# Patient Record
Sex: FEMALE | Race: WHITE
[De-identification: names, ages, dates, MRNs, and addresses within clinical notes are randomized per-mention and may not be internally consistent; named-entity substitution may affect disease eponyms.]

---

## 2018-08-22 ENCOUNTER — HOSPITAL ENCOUNTER (OUTPATIENT)
Dept: HOSPITAL 61 - PCVCCLINIC | Age: 68
Discharge: HOME | End: 2018-08-22
Attending: INTERNAL MEDICINE
Payer: MEDICARE

## 2018-08-22 DIAGNOSIS — Z87.891: ICD-10-CM

## 2018-08-22 DIAGNOSIS — I25.10: Primary | ICD-10-CM

## 2018-08-22 DIAGNOSIS — E78.5: ICD-10-CM

## 2018-08-22 DIAGNOSIS — D83.9: ICD-10-CM

## 2018-08-22 DIAGNOSIS — Z79.82: ICD-10-CM

## 2018-08-22 DIAGNOSIS — I21.4: ICD-10-CM

## 2018-08-22 DIAGNOSIS — I10: ICD-10-CM

## 2018-08-22 PROCEDURE — 93005 ELECTROCARDIOGRAM TRACING: CPT

## 2018-08-22 PROCEDURE — G0463 HOSPITAL OUTPT CLINIC VISIT: HCPCS

## 2018-12-13 ENCOUNTER — HOSPITAL ENCOUNTER (OUTPATIENT)
Dept: HOSPITAL 61 - PCVCIMAG | Age: 68
Discharge: HOME | End: 2018-12-13
Attending: INTERNAL MEDICINE
Payer: MEDICARE

## 2018-12-13 DIAGNOSIS — I10: ICD-10-CM

## 2018-12-13 DIAGNOSIS — I25.10: ICD-10-CM

## 2018-12-13 DIAGNOSIS — E78.5: ICD-10-CM

## 2018-12-13 DIAGNOSIS — Z79.82: ICD-10-CM

## 2018-12-13 DIAGNOSIS — I25.2: ICD-10-CM

## 2018-12-13 DIAGNOSIS — D83.9: ICD-10-CM

## 2018-12-13 DIAGNOSIS — I34.0: Primary | ICD-10-CM

## 2018-12-13 DIAGNOSIS — Z87.891: ICD-10-CM

## 2018-12-13 DIAGNOSIS — I21.4: ICD-10-CM

## 2018-12-13 PROCEDURE — 93306 TTE W/DOPPLER COMPLETE: CPT

## 2018-12-13 PROCEDURE — G0463 HOSPITAL OUTPT CLINIC VISIT: HCPCS

## 2018-12-13 PROCEDURE — 80061 LIPID PANEL: CPT

## 2018-12-13 PROCEDURE — 36415 COLL VENOUS BLD VENIPUNCTURE: CPT

## 2018-12-13 PROCEDURE — 93005 ELECTROCARDIOGRAM TRACING: CPT

## 2018-12-13 NOTE — PCVCIMAG
--------------- APPROVED REPORT --------------





Study performed:  2018 12:57:34



EXAM: Comprehensive 2D, Doppler, and color-flow 

Echocardiogram

Patient Location: Echo lab

Status:  routine



BSA:         1.78

HR: 56 bpmBP:          140/85 mmHg



Other Information 

Study Quality: Good



Risk Factors: 

Cardiac Risk Factors:  FHX of CAD, Hyperlipidemia, 

HTN



Indications

CAD

Stent, Non-Q MI



2D Dimensions

IVSd:  8.24 (7-11mm)LVOT Diam:  18.33 (18-24mm) 

LVDd:  40.00 mm

PWd:  7.80 (7-11mm)Ascending Ao:  32.27 (22-36mm)

LVDs:  33.09 (25-40mm)

Left Atrium:  39.46 (27-40mm)

Aortic Root:  27.53 mm

LV Single Plane 4CH:  52.53 %

LV Single Plane 2CH:  57.17 %

Biplane EF:  54.3 %



Volumes

Left Atrial Volume (Systole)

Single Plane 4CH:  49.21 mLSingle Plane 2CH:  35.11 mL

LA ESV Index:  24.00 mL/m2



Aortic Valve

AoV Peak Abdias.:  1.62 m/s

AO Peak Gr.:  10.56 mmHgLVOT Max P.47 mmHg

LVOT Max V:  1.17 m/s

DOMINIC Vmax: 1.90 cm2



Mitral Valve

E/A Ratio:  1.0

MV Decel. Time:  169.17 ms

MV E Max Abdias.:  1.08 m/s

MV A Abdias.:  1.06 m/s



TDI

E/Lateral E':  10.80E/Medial E':  12.00

Medial E' Abdias.:  0.09 m/s

Lateral E' Abdias.:  0.10 m/s



Pulmonary Valve

PV Peak Gr.:  2.60 mmHg



Pulmonary Vein

P Vein S:    0.67 m/sP Vein A:  0.35 m/s

P Vein D:   0.40 m/sP Vein A Dur.:  62.3 msec

P Vein S/D Ratio:  1.67



Tricuspid Valve

TR Peak Abdias.:  2.29 m/s

TR Peak Gr.:  20.94 mmHg



Left Ventricle

The left ventricle is normal size. There is normal LV segmental wall 

motion. There is normal left ventricular wall thickness. Left 

ventricular systolic function is normal. The left ventricular 

ejection fraction is within the normal range. LVEF is 55%. The left 

ventricular diastolic function is normal.



Right Ventricle

The right ventricle is normal size. The right ventricular systolic 

function is normal.



Atria

The left atrium size is normal. The right atrium size is 

normal.



Aortic Valve

The aortic valve is trileaflet, mildly sclerotic. No aortic 

regurgitation is present. There is no aortic valvular 

stenosis.



Mitral Valve

The mitral valve is normal in structure. Mild mitral regurgitation. 

No evidence of mitral valve stenosis.



Tricuspid Valve

The tricuspid valve is normal in structure. Trace tricuspid 

regurgitation. Pulmonary artery pressure is 28mmHg.



Pulmonic Valve

The pulmonary valve is normal in structure. There is no pulmonic 

valvular regurgitation.



Great Vessels

The aortic root is normal in size. IVC is normal in size and 

collapses &gt;50% with inspiration.



Pericardium

There is no pericardial effusion.



&lt;Conclusion&gt;

Left ventricular systolic function is normal.

There is normal LV segmental wall motion.

LVEF is 55%. Normal diastolic function

The aortic valve is trileaflet, mildly sclerotic. No aortic 

regurgitation or stenosis

The mitral valve is normal in structure. Mild mitral 

regurgitation.

Trace tricuspid regurgitation. Pulmonary artery pressure is 

28mmHg.

There is no pericardial effusion.

## 2019-06-12 ENCOUNTER — HOSPITAL ENCOUNTER (OUTPATIENT)
Dept: HOSPITAL 61 - PCVCCLINIC | Age: 69
Discharge: HOME | End: 2019-06-12
Attending: INTERNAL MEDICINE
Payer: MEDICARE

## 2019-06-12 DIAGNOSIS — Z88.8: ICD-10-CM

## 2019-06-12 DIAGNOSIS — E78.5: ICD-10-CM

## 2019-06-12 DIAGNOSIS — D83.9: ICD-10-CM

## 2019-06-12 DIAGNOSIS — I25.10: Primary | ICD-10-CM

## 2019-06-12 DIAGNOSIS — I10: ICD-10-CM

## 2019-06-12 PROCEDURE — 80061 LIPID PANEL: CPT

## 2019-06-12 PROCEDURE — 93005 ELECTROCARDIOGRAM TRACING: CPT

## 2019-06-12 PROCEDURE — G0463 HOSPITAL OUTPT CLINIC VISIT: HCPCS

## 2019-06-12 PROCEDURE — 36415 COLL VENOUS BLD VENIPUNCTURE: CPT

## 2019-12-12 ENCOUNTER — HOSPITAL ENCOUNTER (OUTPATIENT)
Dept: HOSPITAL 61 - PCVCIMAG | Age: 69
Discharge: HOME | End: 2019-12-12
Attending: INTERNAL MEDICINE
Payer: MEDICARE

## 2019-12-12 DIAGNOSIS — I21.4: ICD-10-CM

## 2019-12-12 DIAGNOSIS — Z88.8: ICD-10-CM

## 2019-12-12 DIAGNOSIS — D83.9: ICD-10-CM

## 2019-12-12 DIAGNOSIS — I10: ICD-10-CM

## 2019-12-12 DIAGNOSIS — E78.5: ICD-10-CM

## 2019-12-12 DIAGNOSIS — Z87.891: ICD-10-CM

## 2019-12-12 DIAGNOSIS — I25.10: Primary | ICD-10-CM

## 2019-12-12 DIAGNOSIS — I34.0: ICD-10-CM

## 2019-12-12 PROCEDURE — 93351 STRESS TTE COMPLETE: CPT

## 2019-12-12 PROCEDURE — 93325 DOPPLER ECHO COLOR FLOW MAPG: CPT

## 2019-12-12 NOTE — PCVCIMAG
--------------- APPROVED REPORT --------------





Study performed:  12/12/2019 15:19:04



Exam:  Stress Echocardiogram

Indication: CAD, Stent

Patient Location: Echo lab

Stress Nurse: Shalini Mcrae RN

Status: routine



Ht: 5 ft 4 in  

HR: 77 bpm      BP: 130/80 mmHg

Rhythm: NSR



Medical History

Medical History: HTN, Hyperlipidemia



Procedure

The patient underwent an Exercise Stress Test using the Cedrick 

Protocol. Blood pressure, heart rate, and EKG were monitored.

An Echocardiogram was performed by technician in four stages in quad 

fashion.  At peak stress, four selected images were obtained and 

placed side by side with resting images for comparison.



Stress Test Details

Stress Test:  Exercise stress testing was performed using a Cedrick 

protocol.

HR

Resting HR:            77 bpmMax Heart Rate (APMHR): 149 bpm 

Max HR Achieved:  155 bpmTarget HR (85% APMHR): 126 bpm

% of APMHR:         104

Recovery HR:            86 bpm

HR response to stress: Normal HR response to stress



BP

Resting BP:  130/80 mmHg

Max BP:       184/94 mmHg

Recovery BP:       184/94 mmHg

BP response to stress: Normal blood pressure response to 

stress.

ECG

Resting ECG:  Sinus Rhythm

Stress ECG:     Sinus Rhythm

ST Change: Normal

Maximum ST Deviation: 0 mm

Arrhythmia:    None

Recovery ECG: Sinus Rhythm

Recovery ST Change: Normal

Recovery ST Deviation: 0 mm

Recovery Arrhythmia: None



Clinical

Reason for Termination: Maximal effort

Exercise duration: 9 min 05 sec

Highest Stage Achieved: Stage 3: 3.4 mph at 14% grade. 

Exercise capacity: 10.30 METs

Overall Exercise Capacity for Age: Good

Angina Score: None



Stress ECG Conclusion

Clinical: Non-ischemic

ECG: Non-ischemic

[facility] Treadmill Score is 9.0 which is Low risk.



Pre-Stress Echo

The resting Echocardiogram showed normal left ventricular 

contractility with an estimated Ejection Fraction of about 55-60%. 

Normal wall motion in all segments on baseline images.



Post-Stress Echo

The stress Echocardiogram showed normal left ventricular 

contractility with an estimated Ejection Fraction of about 60-65%. 

Normal augmentation of wall motion in all segments on post stress 

images.



Clinical

No clinical or ECG evidence for ischemia.



Conclusion

Clinical Response:  Non-ischemic

Exercise Capacity:  Superior

Stress ECG Response:  Non-ischemic

Stress Echo Images:  Non-ischemic

The left ventricle is normal in size and wall thickness in both the 

rest and stress images.

Mild mitral regurgitation.  No other significant valvular 

abnormalities.

Normal stress echocardiogram with maximal exercise stress. 



Other Information

Study Quality: Good



<Conclusion>

The left ventricle is normal in size and wall thickness in both the 

rest and stress images.

Mild mitral regurgitation.  No other significant valvular 

abnormalities.

Normal stress echocardiogram with maximal exercise stress.